# Patient Record
Sex: MALE | Race: WHITE | NOT HISPANIC OR LATINO | URBAN - METROPOLITAN AREA
[De-identification: names, ages, dates, MRNs, and addresses within clinical notes are randomized per-mention and may not be internally consistent; named-entity substitution may affect disease eponyms.]

---

## 2017-05-16 ENCOUNTER — HOSPITAL ENCOUNTER (OUTPATIENT)
Dept: RADIOLOGY | Facility: MEDICAL CENTER | Age: 57
End: 2017-05-16

## 2017-05-17 ENCOUNTER — HOSPITAL ENCOUNTER (OUTPATIENT)
Dept: RADIOLOGY | Facility: MEDICAL CENTER | Age: 57
End: 2017-05-17

## 2017-05-17 ENCOUNTER — HOSPITAL ENCOUNTER (OUTPATIENT)
Dept: RADIOLOGY | Facility: MEDICAL CENTER | Age: 57
End: 2017-05-17
Attending: PSYCHIATRY & NEUROLOGY
Payer: COMMERCIAL

## 2017-05-17 DIAGNOSIS — I63.89 CEREBRAL INFARCTION DUE TO OTHER MECHANISM (HCC): ICD-10-CM

## 2017-05-17 RX ORDER — LISINOPRIL 10 MG/1
10 TABLET ORAL DAILY
COMMUNITY

## 2017-05-17 RX ORDER — OMEPRAZOLE 20 MG/1
20 CAPSULE, DELAYED RELEASE ORAL 2 TIMES DAILY
COMMUNITY

## 2017-05-17 RX ORDER — MAGNESIUM OXIDE 400 MG/1
400 TABLET ORAL DAILY
COMMUNITY

## 2017-05-17 RX ORDER — ASPIRIN 325 MG
325 TABLET ORAL 2 TIMES DAILY
COMMUNITY

## 2017-05-17 RX ORDER — HYDROCHLOROTHIAZIDE 12.5 MG/1
12.5 TABLET ORAL DAILY
COMMUNITY

## 2017-05-17 NOTE — CONSULTS
"Neuro Interventional Service Consultation      Re: Vini Johnson     MRN: [id number]   : 1960    Vini Johnson was urgently referred to our service by Hayden Mccain MD.  He is a 56 y.o. male seen in clinic for evaluation and possible intracranial stent placement. He is also under the care of Dimitri Patino MD in [place], [place].    History of Present Illness:   presented with a \"dizzy spell\" on 2017 that resolved. He was then evaluated by his primary care provider on  and underwent a CT, which was negative for hemorrhage. He underwent additional MRI/ MRA testing. He then developed severe dizziness, vision blurriness, and vomiting. His wife noticed a dysconjugate gaze and drove him from their home in [place] to meet EMS. During the drive he became unresponsive to her questions but she states he never stopped breathing. He was was found to have a left posterior cerebellar stroke. Further imaging revealed a right vertebral artery occlusion and left vertebral artery stenosis. It was determined that the stroke was in the territory of the left posterior inferior cerebellar artery. He was noted to have an allergy to Plavix after his prior coronary artery stent placements in approximately , which he thinks was in the form of hives but he is not sure. He has been on a daily aspirin since then. He is now also taking ticagrelor since his hospitalization for the stroke and is taking aspirin twice daily. He was released from the hospital last week. Today, he denies dizziness, weakness, vision changes, fine motor skill problems, balance problems, or any additional focal symptoms. He has not had the symptoms since starting the ticagrelor and twice daily aspirin. He complains of chronic neck pain, generalized fatigue since the stroke, and feeling like he is having a hard time breathing with exertion as well as at rest, most noticeably at night. It does not improve if he gets up from bed. 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     He is seen today for review of imaging studies and discussion of possible intracranial stent placement.     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     Social History Main Topics   • Smoking status: Not on file   • Smokeless tobacco: Not on file   • Alcohol Use: Not on file   • Drug Use: Not on file   • Sexual Activity: Not on file     Other Topics Concern   • Not on file     Social History Narrative   • No narrative on file     No family history on file.    Review of Systems:  Constitutional: positive for fatigue  Respiratory: positive for dyspnea on exertion and dyspnea at night  Musculoskeletal:positive for neck pain  Neurological: negative  Denies dizziness, vision changes, balance or coordination problems    Labs:   No lab results available for review.    Radiology:   Review of imaging:  CT head non contrast, CTA head, CTA neck, [facility] May 9, 2017:  1. Occlusion of the intracranial, distal aspect of the right vertebral artery.  2. Atherosclerotic disease of the distal left vertebral artery with associated lumen irregularity and focal 50-60% stenosis.  3. The anterior inferior cerebellar arteries are not clearly seen. Bilateral superior cerebellar arteries are noted however, the  left superior cerebellar artery is not identified 1.4 cm from its origin and could be occluded.    MRA head, [facility] April 14, 2017:  MR angiogram of the brain and [place] shows acute vascular abnormalities.    MRI brain, [facility] April 14, 2017:  MRI of the [place], with and without contrast, shows a 19 x 11 mm area acute non hemorrhagic infarction within the central left thalamus and several smaller focal areas of acute nonhemorrhagic infarction within the posterior left cerebellar hemisphere.  The remainder of the brain parenchyma shows minimal chronic leukoencephalopathy without acute change.   There are no abnormalities of the eyes or optic nerves.    Cerebrovascular duplex evaluation, [facility], May 12, 2017:  1. Right: minimal (1-15%) diameter reduction seen in the proximal internal carotid artery with minimal plaque seen in the bulb. The external carotid artery is within normal limits. The vertebral artery flow is high resistant and diminished, suggestive of a distal occlusion. The subclavian artery is within normal limits.  2. Left: minimal (1-15%) diameter reduction seen in the proximal internal carotid artery with minimal plaque seen in the bulb. The external carotid artery is within normal limits. The vertebral and subclavian arteries are within normal limits.  3. Intracranial: No evidence of stenosis seen in the basal segments of the [place]. The bilateral carotid siphons are within normal limits. Flow in the ophthalmic arteries is within normal limits, bilaterally. The right vertebral artery is not visualized, likely occluded. Significant stenosis is seen in the mid to distal left vertebral artery. The basilar artery is patent with post-stenotic flow patterns.     Current Outpatient Prescriptions   Medication Sig Dispense Refill   • Alirocumab (PRALUENT SC) Inject  as instructed every 14 days.     • lisinopril (PRINIVIL) 10 MG Tab Take 10 mg by mouth every day.      • hydrochlorothiazide (HYDRODIURIL) 12.5 MG tablet Take 12.5 mg by mouth every day.     • METOPROLOL TARTRATE PO Take  by mouth 2 Times a Day.     • TICAGRELOR PO Take  by mouth 2 Times a Day.     • aspirin (ASA) 325 MG Tab Take 325 mg by mouth 2 Times a Day.     • Omega-3 Fatty Acids (FISH OIL PO) Take  by mouth every day.     • omeprazole (PRILOSEC) 20 MG delayed-release capsule Take 20 mg by mouth 2 times a day.     • coenzyme Q-10 30 MG capsule Take 60 mg by mouth every day.     • magnesium oxide (MAG-OX) 400 MG Tab Take 400 mg by mouth every day.       No current facility-administered medications for this encounter.       Allergies   Allergen Reactions   • Plavix [Clopidogrel] Hives       Physical Exam:  General Appearance: healthy, alert, no distress, cooperative, hearing aides in place  Lungs: negative findings: normal respiratory rate and rhythm  Musculoskeletal: negative findings: ambulates greater than 100 feet independently with steady gait  Neurologic: intact. Gaze conjugate. Rapid alternating movements intact. No ataxia noted.    Impression:   1. Left vertebral artery stenosis, 50-60% on imaging.  2. Right vertebral artery occlusion.  3. Recent left posterior cerebellar hemisphere stroke on aspirin monotherapy.  4. Coronary artery disease, status post coronary artery bypass graft.   5. Hypertension  6. Hyperlipidemia.    Plan:   Gennaro Simmons MD has reviewed 's history and imaging studies, examined the patient, and discussed treatment options.  had a stroke while on aspirin monotherapy and is now asymptomatic on ticagrelor and has not yet failed optimal medical management. His complaints of constant mild dyspnea and fatigue are not specifically related to the area of stroke and we recommend that he start with his PCP for workup of those complaints as they may not be neurologic in nature. We discussed the method of the stent procedure at length including associated risks of the  intracranial stent placement. We explained that intracranial stents are Humanitarian Use Devices and effectiveness of those devices have not been demonstrated and what that means to the patient. We additionally discussed the procedure risks, including bleeding and infection, damage to the arteries, reaction to any medications given during the procedure, side effects of contrast, radiation exposure, in stent stenosis, stent migration, stroke, hemorrhage, and death. There is a chance the stenosis may ultimately not be amenable to endovascular intervention. After the procedure, there is a chance that the stenosis could recur or worsen. We discussed alternatives of the procedure including surveillance with conservative medical management. We discussed current Up To Date Guidelines regarding primary management of intracranial stenosis by optimizing medical therapy with stent placement reserved for patients who fail first line therapy. The patient verbalizes understanding of the plan and elects to continue his current medical management. We discussed signs of a stroke in the area specific to his area of stenosis with instructions to call an ambulance. He has our contact information for any further questions or concerns. He should continue to follow up with neurology and his primary care provider.         ANTHONY Barajas with Gennaro Simmons MD  Neuro Interventional Service   [facility]   [id number]  [place] [id number]  [phone]